# Patient Record
Sex: FEMALE | Race: WHITE | Employment: FULL TIME | URBAN - METROPOLITAN AREA
[De-identification: names, ages, dates, MRNs, and addresses within clinical notes are randomized per-mention and may not be internally consistent; named-entity substitution may affect disease eponyms.]

---

## 2021-06-10 ENCOUNTER — HOSPITAL ENCOUNTER (EMERGENCY)
Age: 66
Discharge: HOME | End: 2021-06-10
Attending: EMERGENCY MEDICINE
Payer: COMMERCIAL

## 2021-06-10 VITALS
DIASTOLIC BLOOD PRESSURE: 63 MMHG | WEIGHT: 200 LBS | HEIGHT: 63 IN | OXYGEN SATURATION: 96 % | RESPIRATION RATE: 21 BRPM | BODY MASS INDEX: 35.44 KG/M2 | HEART RATE: 59 BPM | TEMPERATURE: 98 F | SYSTOLIC BLOOD PRESSURE: 132 MMHG

## 2021-06-10 DIAGNOSIS — R25.2 BILATERAL LEG CRAMPS: Primary | ICD-10-CM

## 2021-06-10 PROCEDURE — 99281 EMR DPT VST MAYX REQ PHY/QHP: CPT

## 2021-06-10 PROCEDURE — 99282 EMERGENCY DEPT VISIT SF MDM: CPT

## 2021-06-11 NOTE — ED PROVIDER NOTES
Emergency Department Encounter    Patient: Angella Du  MRN: [id number]  : 1955  Date of Evaluation: 2021  ED Provider:  Shahbaz Garrido MD    Triage Chief Complaint:   Leg Pain (cramping) and Headache    [place]:  Angella Du is a 60 y.o. female that presents with lower extremity cramping. Patient reports that she was woken up by bilateral leg cramps. Patient reports the pain was so bad that she was unable to stretch or walk on them. Patient does have a history of night cramps. Patient reports she also has a mild headache. No chest pain, shortness of breath, abdominal pain, nausea, vomiting or diarrhea. No lower extremity swelling. No lower extremity erythema. No history of DVT or PE. Patient reports her symptoms are now much improved and is requesting to be discharged right away. ROS - see HPI, below listed is current ROS at time of my eval:  General:  No fevers, no chills, no weakness  Eyes:  No recent vison changes, no discharge  ENT:  No sore throat, no nasal congestion, no hearing changes  Cardiovascular:  No chest pain, no palpitations  Respiratory:  No shortness of breath, no cough, no wheezing  Gastrointestinal:  No pain, no nausea, no vomiting, no diarrhea  Musculoskeletal:  No muscle pain, no joint pain  Skin:  No rash, no pruritis, no easy bruising  Neurologic:  No speech problems, no headache, no extremity numbness, no extremity tingling, no extremity weakness  Psychiatric:  No anxiety  Genitourinary:  No dysuria, no hematuria  Endocrine:  No unexpected weight gain, no unexpected weight loss  Extremities:  no edema, no pain    No past medical history on file. No past surgical history on file. No family history on file.   Social History     Socioeconomic History    Marital status:      Spouse name: Not on file    Number of children: Not on file    Years of education: Not on file    Highest education level: Not on file   Occupational History    Not on file Tobacco Use    Smoking status: Not on file   Substance and Sexual Activity    Alcohol use: Not on file    Drug use: Not on file    Sexual activity: Not on file   Other Topics Concern    Not on file   Social History Narrative    Not on file     Social Determinants of Health     Financial Resource Strain:     Difficulty of Paying Living Expenses:    Food Insecurity:     Worried About Running Out of Food in the Last Year:     [address] in the Last Year:    Transportation Needs:     Lack of Transportation (Medical):  Lack of Transportation (Non-Medical):    Physical Activity:     Days of Exercise per Week:     Minutes of Exercise per Session:    Stress:     Feeling of Stress :    Social Connections:     Frequency of Communication with Friends and Family:     Frequency of Social Gatherings with Friends and Family:     Attends [employer] Services:     Active Member of Clubs or Organizations:     Attends Club or Organization Meetings:     Marital Status:    Intimate Partner Violence:     Fear of Current or Ex-Partner:     Emotionally Abused:     Physically Abused:     Sexually Abused:      No current facility-administered medications for this encounter. No current outpatient medications on file. Allergies   Allergen Reactions    Augmentin [Amoxicillin-Pot Clavulanate]      Stomach pain       Nursing Notes Reviewed    Physical Exam:  Triage VS:    ED Triage Vitals   Enc Vitals Group      BP 06/10/21 2114 111/84      Pulse 06/10/21 2114 89      Resp 06/10/21 2114 18      Temp 06/10/21 2114 98 °F (36.7 °C)      Temp src --       SpO2 06/10/21 2114 97 %      Weight 06/10/21 2247 200 lb (90.7 kg)      Height 06/10/21 2247 5' 3\" (1.6 m)      Head Circumference --       Peak Flow --       Pain Score --       Pain Loc --       Pain Edu? --       Excl. in [address]? --        My pulse ox interpretation is - normal    General appearance:  No acute distress. Skin:  Warm. Dry.    Eye:  Extraocular movements intact. Ears, nose, mouth and throat:  Oral mucosa moist   Neck:  Trachea midline. Extremity:  No swelling. Normal ROM     Heart:  Regular rate and rhythm, normal S1 & S2, no extra heart sounds. Perfusion:  intact  Respiratory:  Lungs clear to auscultation bilaterally. Respirations nonlabored. Abdominal:  Normal bowel sounds. Soft. Nontender. Non distended. Back:  No CVA tenderness to palpation     Neurological:  Alert and oriented times 3. No focal neuro deficits. Psychiatric:  Appropriate    I have reviewed and interpreted all of the currently available lab results from this visit (if applicable):  No results found for this visit on 06/10/21. Radiographs (if obtained):  Radiologist's Report Reviewed:  No results found. MDM:    81-year-old female brought in via EMS for severe bilateral lower extremity leg cramps in her calf. Patient reports she could not stretch or walk due to the severity of the pain. Patient reports cramps are now completely resolved. Benign exam.  No lower extremity edema, erythema or tenderness. Patient requested to be discharged without any further intervention/evaluation. I do feel this is acceptable based on the patient's symptoms and benign exam.  Strict ED return precautions given. Discussed importance of following up with her primary care physician to discuss further treatment options. Clinical Impression:  1. Bilateral leg cramps      Disposition referral (if applicable):  Alverto Partida MD  [id number] [place] [phone]    Call   For close follow-up    Disposition medications (if applicable): There are no discharge medications for this patient.     ED Provider Disposition Time  DISPOSITION      You were evaluated for bilateral lower leg cramps today in the emergency department.  We discussed treatment options and importance of close follow-up with your primary care physician.  We will not do any laboratory analysis at this point in time at your request due to concerns related to the length of time it would take to get the labs.  Please follow-up with your primary care physician.  Return the emerge department for severe pain or any medical concerns. You may use over-the-counter magnesium to assist with cramps; please discuss further with your primary care physician. Comment: Please note this report has been produced using speech recognition software and may contain errors related to that system including errors in grammar, punctuation, and spelling, as well as words and phrases that may be inappropriate. Efforts were made to edit the dictations.         Toña Dillon MD  06/14/21 [id number]

## 2021-06-11 NOTE — ED TRIAGE NOTES
Patient arrived in the ED with c/o bilateral leg cramping that disabled her ability to walk. Patient stated she still felt pain but could not move legs at all. After assisting to bed patient started c/o a h/a.

## 2021-06-11 NOTE — ED NOTES
Bed: ED-18  Expected date:   Expected time:   Means of arrival:   Comments:  Freddy Valdez RN  06/10/21 2129